# Patient Record
Sex: FEMALE | Race: BLACK OR AFRICAN AMERICAN | Employment: FULL TIME | URBAN - METROPOLITAN AREA
[De-identification: names, ages, dates, MRNs, and addresses within clinical notes are randomized per-mention and may not be internally consistent; named-entity substitution may affect disease eponyms.]

---

## 2023-04-20 ENCOUNTER — HOSPITAL ENCOUNTER (EMERGENCY)
Age: 28
Discharge: HOME | End: 2023-04-20
Attending: EMERGENCY MEDICINE

## 2023-04-20 VITALS
BODY MASS INDEX: 28.43 KG/M2 | WEIGHT: 170.64 LBS | DIASTOLIC BLOOD PRESSURE: 94 MMHG | RESPIRATION RATE: 16 BRPM | SYSTOLIC BLOOD PRESSURE: 110 MMHG | HEART RATE: 92 BPM | HEIGHT: 65 IN | TEMPERATURE: 98.4 F | OXYGEN SATURATION: 100 %

## 2023-04-20 DIAGNOSIS — M62.838 MUSCLE SPASM: ICD-10-CM
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
## 2023-04-20 DIAGNOSIS — S13.4XXA WHIPLASH INJURY TO NECK, INITIAL ENCOUNTER: ICD-10-CM

## 2023-04-20 PROCEDURE — 74011250637 HC RX REV CODE- 250/637: Performed by: EMERGENCY MEDICINE

## 2023-04-20 PROCEDURE — 99283 EMERGENCY DEPT VISIT LOW MDM: CPT

## 2023-04-20 RX ORDER — CYCLOBENZAPRINE HCL 10 MG
10 TABLET ORAL
Status: COMPLETED | OUTPATIENT
Start: 2023-04-20 | End: 2023-04-20

## 2023-04-20 RX ORDER — CYCLOBENZAPRINE HCL 10 MG
10 TABLET ORAL
Qty: 12 TABLET | Refills: 0 | Status: SHIPPED | OUTPATIENT
Start: 2023-04-20 | End: 2023-04-20 | Stop reason: SDUPTHER

## 2023-04-20 RX ORDER — CYCLOBENZAPRINE HCL 10 MG
10 TABLET ORAL
Qty: 12 TABLET | Refills: 0 | Status: SHIPPED | OUTPATIENT
Start: 2023-04-20 | End: 2023-04-25

## 2023-04-20 RX ORDER — IBUPROFEN 800 MG/1
800 TABLET ORAL
Qty: 20 TABLET | Refills: 0 | Status: SHIPPED | OUTPATIENT
Start: 2023-04-20 | End: 2023-04-27

## 2023-04-20 RX ORDER — IBUPROFEN 800 MG/1
800 TABLET ORAL
Qty: 20 TABLET | Refills: 0 | Status: SHIPPED | OUTPATIENT
Start: 2023-04-20 | End: 2023-04-20 | Stop reason: SDUPTHER

## 2023-04-20 RX ORDER — IBUPROFEN 400 MG/1
800 TABLET ORAL
Status: COMPLETED | OUTPATIENT
Start: 2023-04-20 | End: 2023-04-20

## 2023-04-20 RX ADMIN — IBUPROFEN 800 MG: 400 TABLET, FILM COATED ORAL at 21:25

## 2023-04-20 RX ADMIN — CYCLOBENZAPRINE 10 MG: 10 TABLET, FILM COATED ORAL at 21:25
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
## 2023-04-21 NOTE — DISCHARGE INSTRUCTIONS
It was a pleasure taking care of you in our Emergency Department today. We know that when you come to [facility], you are entrusting us with your health, comfort, and safety. Our physicians and nurses honor that trust, and truly appreciate the opportunity to care for you and your loved ones. We also value your feedback. If you receive a survey about your Emergency Department experience today, please fill it out. We care about our patients' feedback, and we listen to what you have to say.   Thank you!       --- Dr. Amy Treadwell MD

## 2023-04-21 NOTE — ED NOTES
Discharge instructions given to patient by Nba Cotton RN. Verbalized understanding of instructions. Patient discharged without difficulty. Patient discharged in stable condition via ambulation accompanied by self.

## 2023-12-07 ENCOUNTER — HOSPITAL ENCOUNTER (EMERGENCY)
Facility: HOSPITAL | Age: 28
Discharge: HOME | End: 2023-12-07

## 2023-12-07 VITALS
RESPIRATION RATE: 16 BRPM | SYSTOLIC BLOOD PRESSURE: 106 MMHG | TEMPERATURE: 99 F | BODY MASS INDEX: 28.32 KG/M2 | DIASTOLIC BLOOD PRESSURE: 56 MMHG | HEIGHT: 65 IN | WEIGHT: 170 LBS | HEART RATE: 71 BPM

## 2023-12-07 DIAGNOSIS — Z34.90 PREGNANT AND NOT YET DELIVERED: ICD-10-CM

## 2023-12-07 LAB
ALBUMIN SERPL-MCNC: 3 G/DL (ref 3.5–5)
ALBUMIN/GLOB SERPL: 0.9 RATIO (ref 1.1–2)
ALP SERPL-CCNC: 48 UNIT/L (ref 40–150)
ALT SERPL-CCNC: 7 UNIT/L (ref 0–55)
AST SERPL-CCNC: 11 UNIT/L (ref 5–34)
BASOPHILS # BLD AUTO: 0.04 X10(3)/MCL
BASOPHILS NFR BLD AUTO: 0.6 %
BILIRUB SERPL-MCNC: 0.8 MG/DL
BUN SERPL-MCNC: 7.1 MG/DL (ref 7–18.7)
CALCIUM SERPL-MCNC: 8.7 MG/DL (ref 8.4–10.2)
CHLORIDE SERPL-SCNC: 107 MMOL/L (ref 98–107)
CO2 SERPL-SCNC: 21 MMOL/L (ref 22–29)
CREAT SERPL-MCNC: 0.65 MG/DL (ref 0.55–1.02)
EOSINOPHIL # BLD AUTO: 0.11 X10(3)/MCL (ref 0–0.9)
EOSINOPHIL NFR BLD AUTO: 1.6 %
ERYTHROCYTE [DISTWIDTH] IN BLOOD BY AUTOMATED COUNT: 14.2 % (ref 11.5–17)
GFR SERPLBLD CREATININE-BSD FMLA CKD-EPI: >60 MLS/MIN/1.73/M2
GLOBULIN SER-MCNC: 3.2 GM/DL (ref 2.4–3.5)
GLUCOSE SERPL-MCNC: 83 MG/DL (ref 74–100)
GROUP & RH: NORMAL
HCT VFR BLD AUTO: 29.3 % (ref 37–47)
HGB BLD-MCNC: 9.6 G/DL (ref 12–16)
IMM GRANULOCYTES # BLD AUTO: 0.04 X10(3)/MCL (ref 0–0.04)
IMM GRANULOCYTES NFR BLD AUTO: 0.6 %
INDIRECT COOMBS GEL: NORMAL
LYMPHOCYTES # BLD AUTO: 1.75 X10(3)/MCL (ref 0.6–4.6)
LYMPHOCYTES NFR BLD AUTO: 26 %
MCH RBC QN AUTO: 28.2 PG (ref 27–31)
MCHC RBC AUTO-ENTMCNC: 32.8 G/DL (ref 33–36)
MCV RBC AUTO: 86.2 FL (ref 80–94)
MONOCYTES # BLD AUTO: 0.71 X10(3)/MCL (ref 0.1–1.3)
MONOCYTES NFR BLD AUTO: 10.5 %
NEUTROPHILS # BLD AUTO: 4.09 X10(3)/MCL (ref 2.1–9.2)
NEUTROPHILS NFR BLD AUTO: 60.7 %
NRBC BLD AUTO-RTO: 0 %
PLATELET # BLD AUTO: 192 X10(3)/MCL (ref 130–400)
PMV BLD AUTO: 11.4 FL (ref 7.4–10.4)
POTASSIUM SERPL-SCNC: 3.6 MMOL/L (ref 3.5–5.1)
PROT SERPL-MCNC: 6.2 GM/DL (ref 6.4–8.3)
RBC # BLD AUTO: 3.4 X10(6)/MCL (ref 4.2–5.4)
SODIUM SERPL-SCNC: 135 MMOL/L (ref 136–145)
SPECIMEN OUTDATE: NORMAL
T PALLIDUM AB SER QL: NONREACTIVE
WBC # SPEC AUTO: 6.74 X10(3)/MCL (ref 4.5–11.5)

## 2023-12-07 PROCEDURE — 80053 COMPREHEN METABOLIC PANEL: CPT | Performed by: OBSTETRICS & GYNECOLOGY

## 2023-12-07 PROCEDURE — 86900 BLOOD TYPING SEROLOGIC ABO: CPT | Performed by: OBSTETRICS & GYNECOLOGY

## 2023-12-07 PROCEDURE — 86780 TREPONEMA PALLIDUM: CPT | Performed by: OBSTETRICS & GYNECOLOGY

## 2023-12-07 PROCEDURE — 99284 EMERGENCY DEPT VISIT MOD MDM: CPT

## 2023-12-07 PROCEDURE — 85025 COMPLETE CBC W/AUTO DIFF WBC: CPT | Performed by: OBSTETRICS & GYNECOLOGY

## 2023-12-07 NOTE — Clinical Note
"Car Mayfield" Olga was seen and treated in our emergency department on 12/7/2023.  She may return to work on 12/08/2023.       If you have any questions or concerns, please don't hesitate to call.      Ally Lewis RN    "

## 2023-12-07 NOTE — ED PROVIDER NOTES
"       THOMAS NOTE     Admit Date: 2023  THOMAS Physician: Evan Meredith  Primary OBGYN: No Attending/UNASSIGNED,    Admit Diagnosis/Chief Complaint:       Chief Complaint   Patient presents with     pt c/o coughing up blood, vaginal spotting, and abdmonial        Hospital Course:  Car Espinoza is a 28 y.o.  at 23w5d presents complaining of spotting when going to the bathroom to urinate and wiped today with a trace of blood on the tissue.  The patient denies any pelvic activity or sexual intercourse in the last week.  She denies any uterine contractions or abdominal pain other than expected lower pelvic round ligament type pain.  Patient also reports that she is had multiple episodes of nausea and vomiting including 1 this morning.  This is when she noticed a trace of blood after the episode of vomiting occurred.  She does not have any specific complaint of cough as mentioned in the chief complaint.    This IUP is complicated by no prenatal care to date.  Patient reports that she is visiting from [place] and plans to return there tomorrow.    Patient denies leakage of fluid, contractions, headache, vision changes, RUQ pain, dysuria, and fever.  Fetal Movement: normal.      BP (!) 106/56 (BP Location: Left arm, Patient Position: Lying)   Pulse 71   Temp 99.4 °F (37.4 °C) (Oral)   Resp 16   Ht 5' 5" (1.651 m)   Wt 77.1 kg (170 lb)   LMP  (LMP Unknown) Comment: conceived early july  BMI 28.29 kg/m²   Temp:  [99.4 °F (37.4 °C)] 99.4 °F (37.4 °C)  Pulse:  [71] 71  Resp:  [16] 16  BP: (106)/(56) 106/56    General: in no apparent distress  Cardiovascular: regular rate and rhythm no murmurs  Respiratory: clear to auscultation, no wheezes, rales or rhonchi, symmetric air entry  Abdominal: fundus soft, nontender 24 weeks size  Back: lumbar tenderness absent CVA tenderness none  Extremeties no redness or tenderness in the calves or thighs no edema    SSE:   SVE:  Cervix is long thick and closed.  White " discharge is noted from vagina.  No evidence of any bleeding.      FHT: Category 1  TOCO:  No contractions    Medical Decision Making:  Patient was observed and external fetal monitoring was obtained while awaiting lab results and ultrasound results.  The ultrasound shows a anterior placenta with some thickening and prominent blood vessels behind the placenta.  This is a finding that we will need to be followed up in future exams and recommended consultation with Maternal-Fetal Medicine physician.  There is no active bleeding in the vaginal area or evidence of any recent active bleeding in the vaginal area.  Patient is having no nausea during her visit today.  Hemoptysis has been discussed.    The area of thickening around the placenta was discussed with the patient including the need to seek a physician for prenatal care as soon as she returns to her home area.      LABS:     Recent Results (from the past 24 hour(s))   Type & Screen    Collection Time: 12/07/23  6:08 AM   Result Value Ref Range    Group & Rh A POS     Indirect Mickie GEL NEG     Specimen Outdate 12/10/2023 23:59    Comprehensive metabolic panel    Collection Time: 12/07/23  6:08 AM   Result Value Ref Range    Sodium Level 135 (L) 136 - 145 mmol/L    Potassium Level 3.6 3.5 - 5.1 mmol/L    Chloride 107 98 - 107 mmol/L    Carbon Dioxide 21 (L) 22 - 29 mmol/L    Glucose Level 83 74 - 100 mg/dL    Blood Urea Nitrogen 7.1 7.0 - 18.7 mg/dL    Creatinine 0.65 0.55 - 1.02 mg/dL    Calcium Level Total 8.7 8.4 - 10.2 mg/dL    Protein Total 6.2 (L) 6.4 - 8.3 gm/dL    Albumin Level 3.0 (L) 3.5 - 5.0 g/dL    Globulin 3.2 2.4 - 3.5 gm/dL    Albumin/Globulin Ratio 0.9 (L) 1.1 - 2.0 ratio    Bilirubin Total 0.8 <=1.5 mg/dL    Alkaline Phosphatase 48 40 - 150 unit/L    Alanine Aminotransferase 7 0 - 55 unit/L    Aspartate Aminotransferase 11 5 - 34 unit/L    eGFR >60 mls/min/1.73/m2   SYPHILIS ANTIBODY (WITH REFLEX RPR)    Collection Time: 12/07/23  6:08 AM    Result Value Ref Range    Syphilis Antibody Nonreactive Nonreactive, Equivocal   CBC with Differential    Collection Time: 12/07/23  6:08 AM   Result Value Ref Range    WBC 6.74 4.50 - 11.50 x10(3)/mcL    RBC 3.40 (L) 4.20 - 5.40 x10(6)/mcL    Hgb 9.6 (L) 12.0 - 16.0 g/dL    Hct 29.3 (L) 37.0 - 47.0 %    MCV 86.2 80.0 - 94.0 fL    MCH 28.2 27.0 - 31.0 pg    MCHC 32.8 (L) 33.0 - 36.0 g/dL    RDW 14.2 11.5 - 17.0 %    Platelet 192 130 - 400 x10(3)/mcL    MPV 11.4 (H) 7.4 - 10.4 fL    Neut % 60.7 %    Lymph % 26.0 %    Mono % 10.5 %    Eos % 1.6 %    Basophil % 0.6 %    Lymph # 1.75 0.6 - 4.6 x10(3)/mcL    Neut # 4.09 2.1 - 9.2 x10(3)/mcL    Mono # 0.71 0.1 - 1.3 x10(3)/mcL    Eos # 0.11 0 - 0.9 x10(3)/mcL    Baso # 0.04 <=0.2 x10(3)/mcL    IG# 0.04 0 - 0.04 x10(3)/mcL    IG% 0.6 %    NRBC% 0.0 %     [unfilled]     Imaging Results              US OB Limited 1 Or More Gestations (Final result)  Result time 12/07/23 07:26:54      Final result by Ozzy Hood MD (12/07/23 07:26:54)                   Impression:      Single viable intrauterine pregnancy with mean age of 23 weeks and 5 days.    Thickened appearing placenta with prominent vessels posterior to the placenta.  Attention to follow-up exams.      Electronically signed by: Ozzy Hood  Date:    12/07/2023  Time:    07:26               Narrative:    EXAMINATION:  US OB LIMITED 1 OR MORE GESTATIONS    CLINICAL HISTORY:  Encounter for supervision of normal pregnancy, unspecified, unspecified trimester    TECHNIQUE:  Multiple real-time images were performed pelvis in various planes by the sonographer    COMPARISON:  None available.    FINDINGS:  There is single viable intrauterine pregnancy with fetal heart rate of 155 beats per minute.  The presentation is vertex.    Fetal survey shows BPD 5.89 cm, hC 21.76 cm, AC 18.15 cm and FL 4.30 cm.  Fetal age by ultrasound is 23 weeks and 5 days    The placenta is located anterior/mid.  The placenta appears to  be thickened with prominent vessels posterior to the placenta.  Amniotic fluid index is 6.3 cm.  Fetal weight is 1 pound and 5 ounces.    Good fetal movement was noted.  There is fluid about the cervix with debris.                                       ASSESMENT: Car Espinoza is a 28 y.o.   at 23w5d with spotting in pregnancy.  Nausea and vomiting in pregnancy.  Thickened placenta on ultrasound exam.  No prenatal care.    Discharge Diagnosis/Clinical Impression:  Pregnancy 23 weeks.  Nausea and vomiting after 22 weeks.  Placental abnormality.    Status:Improved/stable    Disposition:  discharged to home    Medications:   Prenatal vitamins recommended and prescription called in for same.  Prescription for Zofran ODT 8 mg 1q 12 hours also called in.    Patient Instructions:   There are no discharge medications for this patient.      - Pt was given routine pregnancy instructions including to return to triage if she had any vaginal bleeding (other than spotting for the next 48hrs), any loss of fluid like her water broke, decreased fetal movement, or contractions Q 5min lasting for 2 or more hours. Pt was also instructed to drink copious water. Patient voiced understanding of all these instructions and was subsequently discharged home.    During discharge instructions, the patient expressed a desire for termination of pregnancy.  She was informed that this is not a procedure done at this facility and at this gestational age would be very difficult.  If she is unable to proceed with that she is encouraged to  establish a relationship with a OB caregiver and follow up with her primary OB once that has been done.    No discharge procedures on file.    Evan Meredith MD  OB-GYN Hospitalist       Evan Meredith MD  [phone]

## 2023-12-07 NOTE — DISCHARGE INSTRUCTIONS
Follow up with an OB to get prenatal care for the remainder of your pregnacy.      PNV and Zofran from your pharmacy for nausea as needed.    Any leaking, bleeding, consistent cramping return to the hospital for labor evaluation.